# Patient Record
Sex: MALE | Race: WHITE
[De-identification: names, ages, dates, MRNs, and addresses within clinical notes are randomized per-mention and may not be internally consistent; named-entity substitution may affect disease eponyms.]

---

## 2017-04-03 ENCOUNTER — HOSPITAL ENCOUNTER (OUTPATIENT)
Dept: HOSPITAL 39 - AMB | Age: 67
Discharge: HOME | End: 2017-04-03
Attending: FAMILY MEDICINE
Payer: MEDICARE

## 2017-04-03 VITALS — SYSTOLIC BLOOD PRESSURE: 144 MMHG | OXYGEN SATURATION: 95 % | DIASTOLIC BLOOD PRESSURE: 73 MMHG

## 2017-04-03 VITALS — TEMPERATURE: 97.1 F

## 2017-04-03 DIAGNOSIS — E78.1: ICD-10-CM

## 2017-04-03 DIAGNOSIS — Z86.010: ICD-10-CM

## 2017-04-03 DIAGNOSIS — K64.1: ICD-10-CM

## 2017-04-03 DIAGNOSIS — Z79.82: ICD-10-CM

## 2017-04-03 DIAGNOSIS — Z79.899: ICD-10-CM

## 2017-04-03 DIAGNOSIS — Z12.11: Primary | ICD-10-CM

## 2017-04-03 DIAGNOSIS — G25.81: ICD-10-CM

## 2017-04-03 DIAGNOSIS — I25.2: ICD-10-CM

## 2017-04-03 DIAGNOSIS — D12.5: ICD-10-CM

## 2017-04-03 DIAGNOSIS — Z88.8: ICD-10-CM

## 2017-04-03 DIAGNOSIS — Z95.5: ICD-10-CM

## 2017-04-03 DIAGNOSIS — F17.200: ICD-10-CM

## 2017-04-03 DIAGNOSIS — I25.10: ICD-10-CM

## 2017-04-03 DIAGNOSIS — D12.3: ICD-10-CM

## 2017-04-03 DIAGNOSIS — I10: ICD-10-CM

## 2017-04-03 DIAGNOSIS — D12.1: ICD-10-CM

## 2017-04-03 DIAGNOSIS — D12.0: ICD-10-CM

## 2017-04-03 PROCEDURE — 88305 TISSUE EXAM BY PATHOLOGIST: CPT

## 2017-04-03 PROCEDURE — 00810: CPT

## 2017-04-03 PROCEDURE — 45380 COLONOSCOPY AND BIOPSY: CPT

## 2017-04-03 NOTE — SSS
DATE OF OUTPATIENT COLONOSCOPY:   17



CHIEF COMPLAINT:    Need for screening colonoscopy.



HISTORY OF PRESENT ILLNESS:  

Mr. Hook is a 67 year-old male who presents to my office for routine 
followup.  He is due for a colonoscopy.  We had been waiting for him to get off 
of his Plavix which he got off of last fall, in mid November.  He now presents 
for colonoscopy as he is no longer on this blood thinner for his coronary 
artery disease.  He denies any symptoms referable to his bowels, specifically 
no abdominal pain, blood in the stool or change in his bowel habits.



PAST MEDICAL HISTORY: 

1.  Coronary artery disease.  This was diagnosed on 2015 with a 

     non-ST myocardial infarction.

2.  Ongoing tobacco abuse.

3.  Hyperlipidemia, primarily hypertriglyceridemia.  He had a triglyceride of 
1007

     in 2007.

4.  Restless leg syndrome.

5.  B12 deficiency.

6.  History of collarbone fracture.

7.  Very mild hypertension.



PAST SURGICAL HISTORY:

1.  He had a colonoscopy by this physician in 2004 and was found to have \

     only hyperplastic polyps.

2.  Right knee surgery by Dr. Vergara in .

3.  Right inguinal hernia repair and hydrocele repair in .

4.  Laparoscopic appendectomy by Dr. Behr on Magaly 10, 2014.

5.  Coronary artery catheterization by Dr. Munoz that revealed 3-vessel

     coronary artery disease.  

6.  He had angioplasty and Xience stent placed in a completely occluded 

     obtuse marginal 2 and circumflex.  His ejection fraction was 65% at that 
time.



CURRENT MEDICATIONS: 

1.  Livalo 1 mg twice weekly.

2.  Nitroglycerin p.r.n.

3.  Metoprolol succinate 25 mg daily.

4.  Welchol 625 mg, 3 tablets daily.

5.  Aspirin 81 mg daily.

6.  Vitamin B12 500 mcg 2 tablets daily.

 

ALLERGIES:    CRESTOR, LIPITOR, PRAVASTATIN, TRICOR, ALL CAUSE MYALGIAS.

         NIACIN CAUSES SIGNIFICANT FLUSHING.



FAMILY HISTORY:   Father had type 2 diabetes and hypertension.  Mother  at 
age 78 from ALS. She also had hypertension.  He has one brother, Jose, healthy.  
One sister, Abbi, with severe rheumatoid arthritis.  He had a paternal 
grandmother with colon cancer.  He had a maternal grandfather with throat 
cancer who  in his 50s and a paternal grandfather who had congestive heart 
failure, pacemaker and borderline diabetes.



SOCIAL HISTORY:   He is .  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 
technician.  He is a high school graduate with one year of college.  He was in 
the [employer] for 6 years.



REVIEW OF SYSTEMS:   Negative except as per history of present illness.



PHYSICAL EXAMINATION: 



VITAL SIGNS:   Blood pressure 128/70, pulse 84, height 5' 10", weight 234.



GENERAL:   He is awake and alert and in no acute distress. 



HEENT:   Unremarkable.



NECK:   Supple.



CHEST:  Lungs clear. 



CARDIOVASCULAR:    Regular rate and rhythm.

 

ABDOMEN:   Soft, non-tender.



EXTREMITIES:   Without edema.



NEUROLOGIC:   Nonfocal.



RECTAL:   Deferred until time of colonoscopy.



ASSESSMENT:   Need for screening colonoscopy.



PLAN:   Screening colonoscopy on 17.



#[id number]
MTDD

## 2017-04-03 NOTE — OP
DATE OF PROCEDURE:  04/03/17



PREOPERATIVE DIAGNOSIS:

1.  Screening colonoscopy.



POSTOPERATIVE DIAGNOSIS:

1.  Biopsy of a 1.5 x 1.5 cm polypoid lesion noted at the appendiceal orifice.  
This is 

     wither scar tissue versus polyp.  Of note, he had had appendectomy in 
November of 2014.

2.  Sessile 1 x 1.5 cm cecal polyp, biopsied times 6 to obliteration.

3.  Probable arteriovenous malformation approximately 2 mm in diameter in the

     transverse colon that was non-bleeding and left alone.

4.  0.5 x 0.5 cm transverse polyp, biopsied to obliteration.

5.  0.75 x 0.5 cm splenic flexure polyp, removed by snare.

6.  0.5 x 0.5 cm upper sigmoid polyp, removed by snare.



PROCEDURE:

1.  Colonoscopy.



SURGEON:  Shaw Echeverria MD.



ESTIMATED BLOOD LOSS:  None.



COMPLICATIONS:  Around 1 mL.



ANESTHESIA:  Propofol 560 mg administered intravenously by Niraj Rod CRNA, 
using monitored anesthesia care.



TECHNIQUE:  After informed consent was obtained from the patient, the patient 
was taken to the Endoscopy Suite and placed in the left lateral decubitus 
position.  Vital signs were monitored throughout the procedure.  Supplemental 
oxygen was administered throughout the procedure.  After adequate conscious 
sedation was obtained, digital rectal examination was performed, which showed a 
normal sized prostate without suspicious nodules.  The colonoscope was then 
advanced into the patient's rectum and up through the sigmoid, descending, 
transverse and ascending colon to the level of the cecum.  The usual cecal 
landmarks were identified, though the appendiceal orifice had either polyp 
versus scar tissue noted.  Multiple pictures were taken of this area.  The 
terminal ileum was entered and photographed.  We did have one photograph 
containing the terminal ileum, the appendiceal orifice lesion and the sessile 
polyp.  Biopsy was taken of the appendiceal lesion.  It did not bleed much, 
leading me to think it was perhaps scar tissue.  Serrated adenoma was then 
biopsied times 6 to obliteration.  All of this occurred after I had intubated 
the terminal ileum.  The colonoscope was then slowly withdrawn.  The overall 
bowel prep was noted to be quite good.  All the aforementioned polyps in the 
postoperative diagnosis were then noted and removed.  He did have a small, what 
appeared to be a 2 mm arteriovenous malformation in the transverse colon.  It 
was left alone.  In the rectum, the colonoscope was retroflexed.  Grade 2 
internal hemorrhoids were noted.  The colonoscope was then unretroflexed and 
air was suctioned out of the patient's rectum.  The colonoscope was removed 
from the patient.  The patient tolerated the procedure well.



PLAN:  If the lesion at the appendiceal orifice is a polyp, I will consult with 
Dr. Behr about how best to remove that, whether endoscopically with a 
gastroenterologist or surgically.  We might be concerned about perforation 
doing it endoscopically.  If it is just scar tissue, we will repeat his 
colonoscopy in approximately three years.  Of course, this does depend on 
pathology findings.



#[id number]
[facility]

## 2017-11-10 ENCOUNTER — HOSPITAL ENCOUNTER (OUTPATIENT)
Dept: HOSPITAL 39 - GMAL | Age: 67
Discharge: HOME | End: 2017-11-10
Attending: FAMILY MEDICINE
Payer: MEDICARE

## 2017-11-10 DIAGNOSIS — E55.9: ICD-10-CM

## 2017-11-10 DIAGNOSIS — D51.3: ICD-10-CM

## 2017-11-10 DIAGNOSIS — R53.83: Primary | ICD-10-CM

## 2017-11-10 DIAGNOSIS — Z12.5: ICD-10-CM

## 2017-11-10 PROCEDURE — 82306 VITAMIN D 25 HYDROXY: CPT

## 2017-11-10 PROCEDURE — 84443 ASSAY THYROID STIM HORMONE: CPT

## 2017-11-10 PROCEDURE — 82607 VITAMIN B-12: CPT

## 2018-04-03 ENCOUNTER — HOSPITAL ENCOUNTER (OUTPATIENT)
Dept: HOSPITAL 39 - GMAL | Age: 68
Discharge: HOME | End: 2018-04-03
Attending: FAMILY MEDICINE
Payer: MEDICARE

## 2018-04-03 DIAGNOSIS — R53.83: Primary | ICD-10-CM

## 2018-10-16 ENCOUNTER — HOSPITAL ENCOUNTER (OUTPATIENT)
Dept: HOSPITAL 39 - GMAL | Age: 68
End: 2018-10-16
Attending: FAMILY MEDICINE
Payer: MEDICARE

## 2018-10-16 DIAGNOSIS — E55.9: Primary | ICD-10-CM

## 2018-11-28 ENCOUNTER — HOSPITAL ENCOUNTER (OUTPATIENT)
Dept: HOSPITAL 39 - AMB | Age: 68
Discharge: HOME | End: 2018-11-28
Attending: INTERNAL MEDICINE
Payer: MEDICARE

## 2018-11-28 VITALS — SYSTOLIC BLOOD PRESSURE: 121 MMHG | DIASTOLIC BLOOD PRESSURE: 76 MMHG | TEMPERATURE: 97.7 F | OXYGEN SATURATION: 99 %

## 2018-11-28 DIAGNOSIS — Z79.82: ICD-10-CM

## 2018-11-28 DIAGNOSIS — K57.30: ICD-10-CM

## 2018-11-28 DIAGNOSIS — F17.210: ICD-10-CM

## 2018-11-28 DIAGNOSIS — D12.0: ICD-10-CM

## 2018-11-28 DIAGNOSIS — E66.9: ICD-10-CM

## 2018-11-28 DIAGNOSIS — Z79.899: ICD-10-CM

## 2018-11-28 DIAGNOSIS — I25.2: ICD-10-CM

## 2018-11-28 DIAGNOSIS — Z09: Primary | ICD-10-CM

## 2018-11-28 DIAGNOSIS — I25.10: ICD-10-CM

## 2018-11-28 DIAGNOSIS — Z86.010: ICD-10-CM

## 2018-11-28 DIAGNOSIS — D12.1: ICD-10-CM

## 2018-11-28 DIAGNOSIS — K64.8: ICD-10-CM

## 2018-11-28 DIAGNOSIS — K63.5: ICD-10-CM

## 2018-11-28 DIAGNOSIS — K64.4: ICD-10-CM

## 2018-11-28 PROCEDURE — 00811 ANES LWR INTST NDSC NOS: CPT

## 2018-11-28 PROCEDURE — 88305 TISSUE EXAM BY PATHOLOGIST: CPT

## 2018-11-28 PROCEDURE — 45385 COLONOSCOPY W/LESION REMOVAL: CPT

## 2018-11-28 NOTE — OP
DATE OF PROCEDURE:  11/28/18



PREOPERATIVE DIAGNOSIS:

1.  Thecal polyp found by his primary physician.



POSTOPERATIVE DIAGNOSIS:

1.  Colonic polyps.

2.  Internal and external hemorrhoids.

3.  Diverticulosis.



PROCEDURE:

1.  Colonoscopy plus polypectomy.



SURGEON:  Marcelino Valera MD.



COMPLICATIONS:  None apparent.  



BLOOD LOSS:  Minimal related to the procedure.



MEDICATIONS:  Monitored anesthesia care.



DESCRIPTION OF PROCEDURE:  Informed consent was obtained prior to sedation.  
The preprocedure cardiopulmonary assessment was satisfactory.  The patient was 
placed in the left lateral decubitus position and was sedated.  Perianal exam 
reveals non-thrombosed external hemorrhoids and skin tags.  Digital rectal exam 
is unremarkable.  The tip of the Olympus colonoscope was inserted in the rectum 
and guided over to the cecum.  The cecum was identified by locating the 
ileocecal valve and appendiceal orifice.  Prep was good.  The mucosa of the 
cecum, ascending colon, hepatic flexure, transverse colon, splenic flexure, 
descending colon and sigmoid colon was closely examined.  Direct and 
retroflexed views of the rectum were obtained.  Dr. Echeverria was concerned about a 
polypoid lesion at the appendiceal orifice.  This does not appear to be 
adenomatous or of any worrisome significance.  However, to be thorough, I went 
ahead and removed this polypoid growth for histopathologic diagnosis.  There 
were 2 actual polyps in the cecum.  One was about 1.5 cm in length and has the 
appearance of a sessile serrated adenoma.  This was removed in 2 pieces with a 
hot snare and recovered.  The other polyp was sessile and about 5 mm in size.  
It was removed with a hot snare and recovered.  There was one more polyp down 
in the descending colon that was 3 mm in size and sessile.  This was removed 
with a cold snare and recovered.  The patient has multiple sigmoid diverticula.
  The patient has internal hemorrhoids on retroflexed view in the rectum.  
Otherwise, the procedure was unremarkable.  



RECOMMENDATIONS:  Followup polyp pathology and then decide when surveillance is 
necessary.  We will visit with him about his results in a week.



#[id number]



cc:  Shaw Echeverria MD
[facility]

## 2019-07-29 ENCOUNTER — HOSPITAL ENCOUNTER (OUTPATIENT)
Dept: HOSPITAL 39 - GMAL | Age: 69
End: 2019-07-29
Attending: FAMILY MEDICINE
Payer: MEDICARE

## 2019-07-29 DIAGNOSIS — E78.49: ICD-10-CM

## 2019-07-29 DIAGNOSIS — D51.3: Primary | ICD-10-CM

## 2019-07-29 DIAGNOSIS — Z12.5: ICD-10-CM

## 2019-07-29 DIAGNOSIS — R73.09: ICD-10-CM

## 2019-07-29 DIAGNOSIS — E55.9: ICD-10-CM

## 2019-07-29 DIAGNOSIS — R53.83: ICD-10-CM

## 2019-07-29 DIAGNOSIS — I10: ICD-10-CM

## 2019-07-29 PROCEDURE — 84443 ASSAY THYROID STIM HORMONE: CPT

## 2019-07-29 PROCEDURE — 82306 VITAMIN D 25 HYDROXY: CPT

## 2019-07-29 PROCEDURE — 82607 VITAMIN B-12: CPT

## 2020-03-09 ENCOUNTER — HOSPITAL ENCOUNTER (OUTPATIENT)
Age: 70
Discharge: HOME | End: 2020-03-09
Payer: MEDICARE

## 2020-03-09 DIAGNOSIS — H25.11: Primary | ICD-10-CM

## 2020-03-09 PROCEDURE — 66984 XCAPSL CTRC RMVL W/O ECP: CPT

## 2020-03-09 PROCEDURE — 00142 ANES PX ON EYE LENS SURGERY: CPT

## 2020-05-04 ENCOUNTER — HOSPITAL ENCOUNTER (OUTPATIENT)
Dept: HOSPITAL 39 - AMB | Age: 70
Discharge: HOME | End: 2020-05-04
Attending: OPHTHALMOLOGY
Payer: MEDICARE

## 2020-05-04 DIAGNOSIS — I10: ICD-10-CM

## 2020-05-04 DIAGNOSIS — J44.9: ICD-10-CM

## 2020-05-04 DIAGNOSIS — H25.12: Primary | ICD-10-CM

## 2020-05-04 DIAGNOSIS — F17.210: ICD-10-CM

## 2020-05-04 PROCEDURE — 00142 ANES PX ON EYE LENS SURGERY: CPT

## 2020-05-04 PROCEDURE — 66984 XCAPSL CTRC RMVL W/O ECP: CPT

## 2020-07-30 ENCOUNTER — HOSPITAL ENCOUNTER (OUTPATIENT)
Dept: HOSPITAL 39 - GMAL | Age: 70
End: 2020-07-30
Attending: FAMILY MEDICINE
Payer: MEDICARE

## 2020-07-30 DIAGNOSIS — E55.9: ICD-10-CM

## 2020-07-30 DIAGNOSIS — R73.09: ICD-10-CM

## 2020-07-30 DIAGNOSIS — R53.83: ICD-10-CM

## 2020-07-30 DIAGNOSIS — Z79.899: ICD-10-CM

## 2020-07-30 DIAGNOSIS — E78.49: ICD-10-CM

## 2020-07-30 DIAGNOSIS — D51.3: Primary | ICD-10-CM
